# Patient Record
(demographics unavailable — no encounter records)

---

## 2024-10-18 NOTE — DISCUSSION/SUMMARY
[FreeTextEntry1] : 60 y.o  (LMP 37) here for gyn annual    #Well Woman Visit  1. Nutrition/Activity: The benefits of physical activity and balanced diet.  2. Health Screening: She was informed of the benefits of a screening colonoscopy/DEXA/Mammo/Pap. - Cervix: We reviewed ASCCP/ACOG guidelines for pap smear screening.  PAP collected today. - Mammogram ordered - Following with PCP for colorectal cancer screening  3. Sex Health: The importance of safe-sex practices was discussed with the patient. STD screening was offered to patient, she accepts g/c testing 4. Hx of recurrent UTIs-denies any further UTIs since last visit.  Overall improved urinary dysfunction 6. HRT, Continues on Prempro daily tablet.  Denies any side effects with use.  Discussed increased risk for VTE, and cardiovascular risk associated with HRT.  Patient does retain her uterus and requires progesterone with estrogen.        She verbalized understanding and agreement with above counseling regarding differential diagnosis, evaluation, and plan. She was given time for questions/concerns which were all answered to her apparent satisfaction.    RTO in 3-6 months for HRT follow up

## 2024-10-18 NOTE — PHYSICAL EXAM
[Chaperone Present] : A chaperone was present in the examining room during all aspects of the physical examination [[id number]] : A chaperone was present during the pelvic exam. 79 [Appropriately responsive] : appropriately responsive [Alert] : alert [No Acute Distress] : no acute distress [Soft] : soft [Non-tender] : non-tender [No Lesions] : no lesions [No Mass] : no mass [Oriented x3] : oriented x3 [] : implants [No Masses] : no breast masses were palpable [Labia Majora] : normal [Labia Minora] : normal [Normal] : normal [Uterine Adnexae] : normal

## 2024-10-18 NOTE — HISTORY OF PRESENT ILLNESS
[FreeTextEntry1] : 60 y.o  (LMP 37) here for gyn annual  Overall much improved from last visit.  Reports dramatic improvement in mood, UTI-like symptoms, and vaginal atrophy, sexual satisfaction since initiation of oral HRT.  Denies any side effects of use.  Still following with PCP monthly, and was told that all labs are within normal limits.  Denies any postmenopausal bleeding or pelvic pain

## 2024-10-18 NOTE — COUNSELING
[Nutrition/ Exercise/ Weight Management] : nutrition, exercise, weight management [Body Image] : body image [Vitamins/Supplements] : vitamins/supplements [Confidentiality] : confidentiality [STD (testing, results, tx)] : STD (testing, results, tx) [Medication Management] : medication management

## 2025-07-29 NOTE — HISTORY OF PRESENT ILLNESS
[FreeTextEntry1] : Mariana is a 61 y.o  (LMP 37) here for HRT follow up  Large improvement in frequency of UTIs and menopausal symptoms with HRT use. Improved sexual satisfaction.  Denies any postmenopausal bleeding or pelvic pain

## 2025-07-29 NOTE — COUNSELING
[Bladder Hygiene] : bladder hygiene [Confidentiality] : confidentiality [Medication Management] : medication management

## 2025-07-29 NOTE — DISCUSSION/SUMMARY
[FreeTextEntry1] : Mariana is a 61 y.o  (LMP 37) here for HRT follow up   #HRT - Continues on Prempro 0.3-1.5mg daily tablet. - Denies any side effects with use. Discussed increased risk for VTE, and cardiovascular risk associated with HRT. Patient does retain her uterus and requires progesterone with estrogen.  Did not tolerate transdermal patches well, discussed increased VTE risk with pill versus patches.  Verbalized understanding.  - RTO 3-6mo for HRT follow up